# Patient Record
Sex: MALE | Race: OTHER | NOT HISPANIC OR LATINO | URBAN - METROPOLITAN AREA
[De-identification: names, ages, dates, MRNs, and addresses within clinical notes are randomized per-mention and may not be internally consistent; named-entity substitution may affect disease eponyms.]

---

## 2024-04-30 ENCOUNTER — TELEPHONE (OUTPATIENT)
Dept: OPHTHALMOLOGY | Facility: CLINIC | Age: 54
End: 2024-04-30

## 2024-04-30 NOTE — TELEPHONE ENCOUNTER
----- Message from Hannah Cavanaugh sent at 4/30/2024 12:01 PM CDT -----  .Type:  Patient Call Back    Who Called: PT WIFE ESSENCE      Does the patient know what this is regarding?: RESCHEDULE     Would the patient rather a call back YES     Best Call Back Number:  ESSENCE [phone]      Additional Information: Thank You

## 2024-04-30 NOTE — TELEPHONE ENCOUNTER
Spoke with patient's wife Lorna. She stated that the patient missed their appointment today because they did not know the name of our clinic and had no idea where to go. Provided her with the clinic's name and address then transferred to the front staff to reschedule the appointment.